# Patient Record
Sex: FEMALE | URBAN - METROPOLITAN AREA
[De-identification: names, ages, dates, MRNs, and addresses within clinical notes are randomized per-mention and may not be internally consistent; named-entity substitution may affect disease eponyms.]

---

## 2024-03-27 ENCOUNTER — APPOINTMENT (OUTPATIENT)
Dept: URBAN - METROPOLITAN AREA SURGERY 11 | Age: 69
Setting detail: DERMATOLOGY
End: 2024-03-27

## 2024-03-27 PROBLEM — C44.92 SQUAMOUS CELL CARCINOMA OF SKIN, UNSPECIFIED: Status: ACTIVE | Noted: 2024-03-27

## 2024-03-27 PROCEDURE — OTHER MOHS SURGERY PHONE CONSULTATION: OTHER

## 2024-03-27 NOTE — PROCEDURE: MOHS SURGERY PHONE CONSULTATION
Additional Information?: No Imbruvica
Has The Patient Ever Received A Transplant?: No
Which Antibiotic Do They Take For Surgical Prophylaxis?: Amoxicillin (2 grams)
Office Location Of Mohs Surgery: [place]
Patient Reported Location: left lateral ankle
Detail Level: Simple
Date Of Mohs Surgery: 06/17/2024
Time Of Mohs Surgery: 8:00am
Has The Pathology Report Been Received?: Yes